# Patient Record
Sex: FEMALE | Race: BLACK OR AFRICAN AMERICAN | NOT HISPANIC OR LATINO | URBAN - METROPOLITAN AREA
[De-identification: names, ages, dates, MRNs, and addresses within clinical notes are randomized per-mention and may not be internally consistent; named-entity substitution may affect disease eponyms.]

---

## 2022-08-18 ENCOUNTER — APPOINTMENT (RX ONLY)
Dept: URBAN - METROPOLITAN AREA CLINIC 28 | Facility: CLINIC | Age: 67
Setting detail: DERMATOLOGY
End: 2022-08-18

## 2022-08-18 DIAGNOSIS — Z71.89 OTHER SPECIFIED COUNSELING: ICD-10-CM

## 2022-08-18 DIAGNOSIS — I83.9 ASYMPTOMATIC VARICOSE VEINS OF LOWER EXTREMITIES: ICD-10-CM

## 2022-08-18 DIAGNOSIS — D22 MELANOCYTIC NEVI: ICD-10-CM

## 2022-08-18 DIAGNOSIS — L81.4 OTHER MELANIN HYPERPIGMENTATION: ICD-10-CM

## 2022-08-18 DIAGNOSIS — L60.3 NAIL DYSTROPHY: ICD-10-CM | Status: WORSENING

## 2022-08-18 DIAGNOSIS — L82.1 OTHER SEBORRHEIC KERATOSIS: ICD-10-CM

## 2022-08-18 DIAGNOSIS — L72.0 EPIDERMAL CYST: ICD-10-CM

## 2022-08-18 PROBLEM — D22.5 MELANOCYTIC NEVI OF TRUNK: Status: ACTIVE | Noted: 2022-08-18

## 2022-08-18 PROBLEM — I83.93 ASYMPTOMATIC VARICOSE VEINS OF BILATERAL LOWER EXTREMITIES: Status: ACTIVE | Noted: 2022-08-18

## 2022-08-18 PROCEDURE — ? SUNSCREEN RECOMMENDATIONS

## 2022-08-18 PROCEDURE — ? COUNSELING

## 2022-08-18 PROCEDURE — 99203 OFFICE O/P NEW LOW 30 MIN: CPT

## 2022-08-18 PROCEDURE — ? RECOMMENDATIONS

## 2022-08-18 PROCEDURE — ? TREATMENT REGIMEN

## 2022-08-18 ASSESSMENT — LOCATION DETAILED DESCRIPTION DERM
LOCATION DETAILED: LEFT MEDIAL SUPERIOR CHEST
LOCATION DETAILED: RIGHT MID-UPPER BACK
LOCATION DETAILED: INFERIOR THORACIC SPINE
LOCATION DETAILED: LEFT MID-UPPER BACK
LOCATION DETAILED: RIGHT POPLITEAL SKIN
LOCATION DETAILED: SUPERIOR THORACIC SPINE
LOCATION DETAILED: LEFT DISTAL POSTERIOR UPPER ARM
LOCATION DETAILED: LEFT POPLITEAL SKIN

## 2022-08-18 ASSESSMENT — LOCATION ZONE DERM
LOCATION ZONE: ARM
LOCATION ZONE: TRUNK
LOCATION ZONE: TRUNK
LOCATION ZONE: LEG

## 2022-08-18 ASSESSMENT — LOCATION SIMPLE DESCRIPTION DERM
LOCATION SIMPLE: LEFT POPLITEAL SKIN
LOCATION SIMPLE: CHEST
LOCATION SIMPLE: LEFT POSTERIOR UPPER ARM
LOCATION SIMPLE: UPPER BACK
LOCATION SIMPLE: RIGHT UPPER BACK
LOCATION SIMPLE: RIGHT POPLITEAL SKIN
LOCATION SIMPLE: LEFT UPPER BACK

## 2022-08-18 NOTE — PROCEDURE: TREATMENT REGIMEN
Initiate Treatment: I counseled the patient regarding the following:\\nInstructions: Monthly self-skin checks to monitor for any changes in moles are recommended.\\n\\nExpectations: Benign Nevi are pigmented nests of cells within the skin. No treatment is necessary.\\nContact Office if: Any moles change in size, shape or color; itch, burn or bleed.\\n\\nPatient was reassured that these benign nevi do not appear concerning, and to alert me if any changes (e.g. asymmetry, irregular borders, change in color, size, etc.) are observed.
Detail Level: Zone
Plan: Likely has onychomycosis, though pt reports some darkening and cannot fully evaluate today

## 2022-08-18 NOTE — PROCEDURE: SUNSCREEN RECOMMENDATIONS
Products Recommended: According to Consumer Reports:\\n\\nThree sunscreens were given the Consumer Reports \"Best Buy\" rating:\\n\\nUp & Up Sport SPF 30 \\nNo-Ad with Aloe and Vitamin E SPF 45 \\nEquate Baby SPF 50\\nSix others were recommended:\\nBanana Boat Sport Performance SPF 30 \\nCoppertone Sport Ultra Sweatproof SPF 30 \\nCVS Fast Cover Sport SPF 30 \\nWalgreens Sport SPF 50 \\nOcean Potion Kids Instant Dry Mist SPF 50 \\nBanana Boat Sport Performance 
General Sunscreen Counseling: - The nature of sun-induced photo-aging and skin cancers is discussed.  Sun avoidance, protective clothing, and the use of 30-SPF sunscreens are advised. Observe closely for skin damage/changes, and call if such occur.\\n- Advised to use daily sunscreen SPF 30 with UVB and UVA protection daily.  Apply at least 10 minutes prior to going outside, and reapply every 2 hours outside.  Recommended sunblocks with zinc oxide or titanium dioxide (though these tend not to rub in as well).
Detail Level: Detailed

## 2023-06-13 ENCOUNTER — APPOINTMENT (RX ONLY)
Dept: URBAN - METROPOLITAN AREA CLINIC 28 | Facility: CLINIC | Age: 68
Setting detail: DERMATOLOGY
End: 2023-06-13

## 2023-06-13 DIAGNOSIS — L72.0 EPIDERMAL CYST: ICD-10-CM | Status: STABLE

## 2023-06-13 DIAGNOSIS — B35.3 TINEA PEDIS: ICD-10-CM

## 2023-06-13 DIAGNOSIS — L23.9 ALLERGIC CONTACT DERMATITIS, UNSPECIFIED CAUSE: ICD-10-CM

## 2023-06-13 PROCEDURE — ? DIAGNOSIS COMMENT

## 2023-06-13 PROCEDURE — ? DEFER

## 2023-06-13 PROCEDURE — ? PRESCRIPTION

## 2023-06-13 PROCEDURE — ? MEDICATION COUNSELING

## 2023-06-13 PROCEDURE — ? PRESCRIPTION MEDICATION MANAGEMENT

## 2023-06-13 PROCEDURE — 99213 OFFICE O/P EST LOW 20 MIN: CPT

## 2023-06-13 PROCEDURE — ? COUNSELING

## 2023-06-13 PROCEDURE — ? RECOMMENDATIONS

## 2023-06-13 PROCEDURE — ? PHOTO-DOCUMENTATION

## 2023-06-13 RX ORDER — HYDROCORTISONE 25 MG/G
1 CREAM TOPICAL QDAY
Qty: 30 | Refills: 2 | Status: ERX | COMMUNITY
Start: 2023-06-13

## 2023-06-13 RX ORDER — CLOTRIMAZOLE AND BETAMETHASONE DIPROPIONATE 10; .5 MG/G; MG/G
1 CREAM TOPICAL BID
Qty: 45 | Refills: 3 | Status: ERX | COMMUNITY
Start: 2023-06-13

## 2023-06-13 RX ADMIN — HYDROCORTISONE 1: 25 CREAM TOPICAL at 00:00

## 2023-06-13 RX ADMIN — CLOTRIMAZOLE AND BETAMETHASONE DIPROPIONATE 1: 10; .5 CREAM TOPICAL at 00:00

## 2023-06-13 ASSESSMENT — LOCATION SIMPLE DESCRIPTION DERM
LOCATION SIMPLE: RIGHT UPPER BACK
LOCATION SIMPLE: LEFT ZYGOMA
LOCATION SIMPLE: RIGHT FOOT

## 2023-06-13 ASSESSMENT — LOCATION ZONE DERM
LOCATION ZONE: FACE
LOCATION ZONE: TRUNK
LOCATION ZONE: FEET

## 2023-06-13 ASSESSMENT — LOCATION DETAILED DESCRIPTION DERM
LOCATION DETAILED: RIGHT SUPERIOR UPPER BACK
LOCATION DETAILED: LEFT CENTRAL ZYGOMA
LOCATION DETAILED: RIGHT MEDIAL DORSAL FOOT

## 2023-06-13 NOTE — PROCEDURE: PRESCRIPTION MEDICATION MANAGEMENT
Detail Level: Simple
Render In Strict Bullet Format?: No
Initiate Treatment: clotrimazole-betamethasone 1%-0.05% topical cream: Apply a thin layer to AA of feet BID
Detail Level: Zone
Initiate Treatment: hydrocortisone 2.5% topical cream: Apply a thin layer to AA of face qday until clear then d/c

## 2023-07-11 ENCOUNTER — APPOINTMENT (RX ONLY)
Dept: URBAN - METROPOLITAN AREA CLINIC 28 | Facility: CLINIC | Age: 68
Setting detail: DERMATOLOGY
End: 2023-07-11

## 2023-07-11 DIAGNOSIS — B35.3 TINEA PEDIS: ICD-10-CM | Status: IMPROVED

## 2023-07-11 DIAGNOSIS — L23.9 ALLERGIC CONTACT DERMATITIS, UNSPECIFIED CAUSE: ICD-10-CM | Status: RESOLVING

## 2023-07-11 PROCEDURE — ? PRESCRIPTION MEDICATION MANAGEMENT

## 2023-07-11 PROCEDURE — ? RECOMMENDATIONS

## 2023-07-11 PROCEDURE — ? COUNSELING

## 2023-07-11 PROCEDURE — ? MEDICATION COUNSELING

## 2023-07-11 PROCEDURE — 99213 OFFICE O/P EST LOW 20 MIN: CPT

## 2023-07-11 ASSESSMENT — LOCATION SIMPLE DESCRIPTION DERM
LOCATION SIMPLE: RIGHT FOOT
LOCATION SIMPLE: LEFT ZYGOMA

## 2023-07-11 ASSESSMENT — LOCATION ZONE DERM
LOCATION ZONE: FACE
LOCATION ZONE: FEET

## 2023-07-11 ASSESSMENT — LOCATION DETAILED DESCRIPTION DERM
LOCATION DETAILED: LEFT CENTRAL ZYGOMA
LOCATION DETAILED: RIGHT MEDIAL DORSAL FOOT

## 2023-07-11 NOTE — PROCEDURE: PRESCRIPTION MEDICATION MANAGEMENT
Detail Level: Simple
Continue Regimen: clotrimazole-betamethasone 1%-0.05% topical cream: Apply a thin layer to AA of feet BID
Render In Strict Bullet Format?: No
Detail Level: Zone
Discontinue Regimen: hydrocortisone 2.5% topical cream: Apply a thin layer to AA of face qday until clear then d/c

## 2023-07-11 NOTE — PROCEDURE: RECOMMENDATIONS
Recommendation Preamble: The following recommendations were made during the visit:
Recommendations (Free Text): Apple cider vinegar soaks
Detail Level: Zone
Render Risk Assessment In Note?: no

## 2023-07-11 NOTE — PROCEDURE: MEDICATION COUNSELING
Detail Level: Detailed Colchicine Pregnancy And Lactation Text: This medication is Pregnancy Category C and isn't considered safe during pregnancy. It is excreted in breast milk. Quality 337: Tuberculosis Prevention For Psoriasis And Psoriatic Arthritis Patients On A Biological Immune Response Modifier: Patient has a documented negative annual TB screening.